# Patient Record
(demographics unavailable — no encounter records)

---

## 2025-03-17 NOTE — HISTORY OF PRESENT ILLNESS
[de-identified] : 76 y/o F p/w l ear pressure for the past x2 months. Notices new change with decrease in left hearing specifically with people with low voices and with heavy accents. Mother has h/o age related hearing loss. States daughter has h/o cerumen impaction. Denies recent URI or recent airplane travel. Denies qtip use. She may grind her teeth. She feels it is more of a pressure sensation than a hl. Nonsmoker. 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 Has h/o bruxism.

## 2025-03-17 NOTE — ASSESSMENT
[FreeTextEntry1] : 1. left ear pressure -audio exam: snhl AU AD>AS, type A tymps - results reviewed with patient  -MRI brain and IAC, prefers without contrast (risks/benefits discussed); will also see left ear and sinuses -RTC with mri to discuss results discussed hae prefers to hold off

## 2025-03-17 NOTE — PHYSICAL EXAM
[Normal] : mucosa is normal [Midline] : trachea located in midline position [] : septum deviated to the left [de-identified] : l copious cerumen noted, removed atraumatically with curette, b tm intact [de-identified] : gait steady

## 2025-03-17 NOTE — HISTORY OF PRESENT ILLNESS
[de-identified] : 76 y/o F p/w l ear pressure for the past x2 months. Notices new change with decrease in left hearing specifically with people with low voices and with heavy accents. Mother has h/o age related hearing loss. States daughter has h/o cerumen impaction. Denies recent URI or recent airplane travel. Denies qtip use. She may grind her teeth. She feels it is more of a pressure sensation than a hl. Nonsmoker. 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 Has h/o bruxism.

## 2025-03-17 NOTE — PROCEDURE
[Cerumen Impaction] : Cerumen Impaction [Same] : same as the Pre Op Dx. [] : Removal of Cerumen [Topical Lidocaine] : topical lidocaine [Oxymetazoline HCl] : oxymetazoline HCl [Flexible Endoscope] : examined with the flexible endoscope [Posterior Lesion] : posterior lesion [Anterior rhinoscopy insufficient to account for symptoms] : anterior rhinoscopy insufficient to account for symptoms [Deviated to the Lt] : deviated to the left [Normal] : the nasopharynx was normal [Serial Number: ___] : Serial Number: [unfilled] [de-identified] : done for l er pressure and facial pressure with history of facial fracture repair on left to ro infx or neoplasm in npx with referred pain could not see with speculum [FreeTextEntry6] : l copious cerumen noted, removed atraumatically with curette, b tm intact

## 2025-03-17 NOTE — PROCEDURE
[Cerumen Impaction] : Cerumen Impaction [Same] : same as the Pre Op Dx. [] : Removal of Cerumen [Topical Lidocaine] : topical lidocaine [Oxymetazoline HCl] : oxymetazoline HCl [Flexible Endoscope] : examined with the flexible endoscope [Posterior Lesion] : posterior lesion [Anterior rhinoscopy insufficient to account for symptoms] : anterior rhinoscopy insufficient to account for symptoms [Deviated to the Lt] : deviated to the left [Normal] : the nasopharynx was normal [Serial Number: ___] : Serial Number: [unfilled] [de-identified] : done for l er pressure and facial pressure with history of facial fracture repair on left to ro infx or neoplasm in npx with referred pain could not see with speculum [FreeTextEntry6] : l copious cerumen noted, removed atraumatically with curette, b tm intact

## 2025-03-17 NOTE — PHYSICAL EXAM
[Normal] : mucosa is normal [Midline] : trachea located in midline position [] : septum deviated to the left [de-identified] : l copious cerumen noted, removed atraumatically with curette, b tm intact [de-identified] : gait steady

## 2025-03-24 NOTE — DATA REVIEWED
[de-identified] : mri reviewed report and images with pt . Within limitations of this unenhanced study, no apparent cerebellopontine angle or internal auditory canal mass.  2. The left superior cerebellar artery traverses the cisternal portion of the left trigeminal nerve.   3. Stable minimal chronic small vessel ischemic disease. No acute infarct.  4. 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  5. Decreased prominence of the mucous retention cysts in the right maxillary sinus.

## 2025-03-24 NOTE — PHYSICAL EXAM
[Midline] : trachea located in midline position [Normal] : no rashes [de-identified] : b [de-identified] : gait steady

## 2025-03-24 NOTE — ASSESSMENT
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 However, sx are not c/w trigeminal neuralgia; she agreed they are not consistent but will mention it to neurologist

## 2025-03-24 NOTE — HISTORY OF PRESENT ILLNESS
[de-identified] : 1 week follow up visit for this 76 y/o F. During last visit, she was noted to have snhl AU with asymmetry AD>AS. Here to discuss MRI for eval of asymmetric hearing loss. She has had 2 episodes of being unable to finish a sentence in the past month. She declined contrast with the mri.